# Patient Record
Sex: MALE | Race: WHITE
[De-identification: names, ages, dates, MRNs, and addresses within clinical notes are randomized per-mention and may not be internally consistent; named-entity substitution may affect disease eponyms.]

---

## 2023-05-31 ENCOUNTER — HOSPITAL ENCOUNTER (EMERGENCY)
Dept: HOSPITAL 26 - MED | Age: 38
Discharge: HOME | End: 2023-05-31
Payer: COMMERCIAL

## 2023-05-31 VITALS — SYSTOLIC BLOOD PRESSURE: 128 MMHG | DIASTOLIC BLOOD PRESSURE: 61 MMHG

## 2023-05-31 VITALS — SYSTOLIC BLOOD PRESSURE: 129 MMHG | DIASTOLIC BLOOD PRESSURE: 74 MMHG

## 2023-05-31 VITALS — HEIGHT: 71 IN | BODY MASS INDEX: 34.32 KG/M2 | WEIGHT: 245.13 LBS

## 2023-05-31 DIAGNOSIS — S16.1XXA: Primary | ICD-10-CM
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
## 2023-05-31 DIAGNOSIS — M54.50: ICD-10-CM

## 2023-05-31 DIAGNOSIS — S09.90XA: ICD-10-CM
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
## 2023-05-31 DIAGNOSIS — M51.26: ICD-10-CM

## 2023-05-31 DIAGNOSIS — R51.9: ICD-10-CM
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
## 2023-05-31 PROCEDURE — 72131 CT LUMBAR SPINE W/O DYE: CPT

## 2023-05-31 PROCEDURE — 99285 EMERGENCY DEPT VISIT HI MDM: CPT

## 2023-05-31 PROCEDURE — 96372 THER/PROPH/DIAG INJ SC/IM: CPT

## 2023-05-31 PROCEDURE — 70450 CT HEAD/BRAIN W/O DYE: CPT

## 2023-05-31 NOTE — NUR
Patient discharged with v/s stable. Written and verbal after care instructions 
given and explained. 

Patient alert, oriented and verbalized understanding of instructions. 
Ambulatory with steady gait. All questions addressed prior to discharge. ID 
band removed. Patient advised to follow up with PMD. Rx of NORCO, MOTRIN, 
ROBAXIN given. Patient educated on indication of medication including possible 
reaction and side effects. Opportunity to ask questions provided and answered.

## 2023-05-31 NOTE — NUR
-------------------------------------------------------------------------------

            *** Note undone in ED - 05/31/23 at 2003 by LOPEZ ***            

-------------------------------------------------------------------------------

PT TRANSPORTED TO [facility]